# Patient Record
Sex: FEMALE | Race: BLACK OR AFRICAN AMERICAN | NOT HISPANIC OR LATINO | Employment: OTHER | URBAN - METROPOLITAN AREA
[De-identification: names, ages, dates, MRNs, and addresses within clinical notes are randomized per-mention and may not be internally consistent; named-entity substitution may affect disease eponyms.]

---

## 2017-02-27 DIAGNOSIS — G40.201 LOCALIZATION-RELATED (FOCAL) (PARTIAL) SYMPTOMATIC EPILEPSY AND EPILEPTIC SYNDROMES WITH COMPLEX PARTIAL SEIZURES, NOT INTRACTABLE, WITH STATUS EPILEPTICUS (H): ICD-10-CM

## 2017-02-28 RX ORDER — LEVETIRACETAM 500 MG/1
TABLET ORAL
Qty: 90 TABLET | Refills: 0 | Status: SHIPPED | OUTPATIENT
Start: 2017-02-28 | End: 2017-03-27

## 2017-03-27 DIAGNOSIS — G40.201 LOCALIZATION-RELATED (FOCAL) (PARTIAL) SYMPTOMATIC EPILEPSY AND EPILEPTIC SYNDROMES WITH COMPLEX PARTIAL SEIZURES, NOT INTRACTABLE, WITH STATUS EPILEPTICUS (H): ICD-10-CM

## 2017-03-28 RX ORDER — LEVETIRACETAM 500 MG/1
TABLET ORAL
Qty: 90 TABLET | Refills: 0 | Status: SHIPPED | OUTPATIENT
Start: 2017-03-28 | End: 2018-09-06

## 2017-06-15 DIAGNOSIS — G40.201 LOCALIZATION-RELATED (FOCAL) (PARTIAL) SYMPTOMATIC EPILEPSY AND EPILEPTIC SYNDROMES WITH COMPLEX PARTIAL SEIZURES, NOT INTRACTABLE, WITH STATUS EPILEPTICUS (H): ICD-10-CM

## 2017-06-15 RX ORDER — LEVETIRACETAM 500 MG/1
TABLET ORAL
Qty: 90 TABLET | Refills: 1 | Status: SHIPPED | OUTPATIENT
Start: 2017-06-15 | End: 2017-10-18

## 2017-09-13 ENCOUNTER — OFFICE VISIT (OUTPATIENT)
Dept: NEUROLOGY | Facility: CLINIC | Age: 57
End: 2017-09-13

## 2017-09-13 VITALS — HEART RATE: 113 BPM | RESPIRATION RATE: 18 BRPM | SYSTOLIC BLOOD PRESSURE: 100 MMHG | DIASTOLIC BLOOD PRESSURE: 74 MMHG

## 2017-09-13 DIAGNOSIS — G40.309 GENERALIZED CONVULSIVE EPILEPSY (H): Primary | ICD-10-CM

## 2017-09-13 ASSESSMENT — PAIN SCALES - GENERAL: PAINLEVEL: NO PAIN (0)

## 2017-09-13 NOTE — PROGRESS NOTES
"INTERVAL HISTORY:   This patient is seen in followup with a seizure disorder.  She is here with her /PCA Rohan.  I last saw the patient in 10/2016.  At that time she was having issues with seizure disorder and has chronic gait imbalance.  She is on Keppra 500 mg 2 or 3 tablets a day, depending on \"how she is doing.\"  The  reports that in May, there was an event.  The patient and Rohan were asked to leave their rental property.  This created tremendous stress for the patient.  They could not find a place to go.  They ended up in a hotel.  He had gone out to the store 1 day.  When he came back she \"looked lost.\"  She reported that she had called the bank and threatened them with bombing.  Rohan thought that she was joking.  However, shortly thereafter, the police showed up.  She was incarcerated for 4 days.  She has a court date coming up.  She really has no recollection of calling the bank and threatening them, although she does admit that she was quite stressed out by the problem with housing and does not know what she did.  She has never had an arrest or a legal problem like this before, so the entire episode is somewhat mystifying.  They now have a new residence in a second floor apartment.  That is going well.  She was having \"3-5 attacks a day.\"  Her eyes would roll back her body would \"lock up.\"  She had 1 episode where she fell.  However, Rohan says she has not had any seizures in the last 2 months.  She is on Keppra 500 mg twice a day.  A Keppra level in June was 8.      PHYSICAL EXAMINATION:   VITAL SIGNS:   Her blood pressure is 100/74.     NEUROLOGIC:   She walks on a wide base, and that is chronic.  There is nothing else to add on neurologic exam.      ASSESSMENT:     1.  Seizure disorder, on Keppra.   2.  Gait imbalance.      DISCUSSION:  The patient is seen with the above problem list.  It sounds like she had some sort of confusional event in May when she threatened the bank.  It is not really " clear to me what happened, but she did get into significant trouble with the police as a consequence.  It would be unusual for a person to have a seizure and carry out a willful act like that.  However, there could have been some postictal confusion if she had had a seizure.  In any case, that along with Rohan's history that occasionally the patient has up to 5 attacks a day would suggest that her seizures are not controlled.  Her levetiracetam level was low, at 8.  I am going to refer her to one of the epilepsy experts in this regard.  I am going to increase her Keppra dose back to 500 mg 3 times a day.  In reviewing her record, she has had Keppra levels as high as 75 in 2017.  I did not make an appointment to see her back, but will do so on an as needed basis.      Ney Valencia MD      cc:   Florencia Driscoll DO   [place] [address] [place] [address]         NEY VALENCIA MD             D: 2017 15:38   T: 2017 16:53   MT: AKA      Name:     PATO FOLEY   MRN:      [id number]        Account:      [id number]   :      1960           Service Date: 2017      Document: [id number]

## 2017-09-13 NOTE — LETTER
"9/13/2017       RE: Kim Mayen  [address]  [place] [address]     Dear Colleague,    Thank you for referring your patient, Kim Mayen, to the [facility] at [facility]. Please see a copy of my visit note below.    INTERVAL HISTORY:   This patient is seen in followup with a seizure disorder.  She is here with her /PCA Rohan.  I last saw the patient in 10/2016.  At that time she was having issues with seizure disorder and has chronic gait imbalance.  She is on Keppra 500 mg 2 or 3 tablets a day, depending on \"how she is doing.\"  The  reports that in May, there was an event.  The patient and Rohan were asked to leave their rental property.  This created tremendous stress for the patient.  They could not find a place to go.  They ended up in a hotel.  He had gone out to the store 1 day.  When he came back she \"looked lost.\"  She reported that she had called the bank and threatened them with bombing.  Rohan thought that she was choking.  However, shortly thereafter, the police showed up.  She was incarcerated for 4 days.  She has a court date coming up.  She really has no recollection of calling the bank and threatening them, although she does admit that she was quite stressed out by the problem with housing and does not know what she did.  She has never had an arrest or a legal problem like this before, so the entire episode is somewhat mystifying.  They now have a new residence in a second floor apartment.  That is going well.  She was having \"3-5 attacks a day.\"  Her eyes would roll back her body would \"lock up.\"  She had 1 episode where she fell.  However, Rohan says she has not had any seizures in the last 2 months.  She is on Keppra 500 mg twice a day.  A Keppra level in June was 8.      PHYSICAL EXAMINATION:   VITAL SIGNS:   Her blood pressure is 100/74.     NEUROLOGIC:   She walks on a wide base, and " that is chronic.  There is nothing else to add on neurologic exam.      ASSESSMENT:     1.  Seizure disorder, on Keppra.   2.  Gait imbalance.      DISCUSSION:  The patient is seen with the above problem list.  It sounds like she had some sort of confusional event in May when she threatened the bank.  It is not really clear to me what happened, but she did get into significant trouble with the police as a consequence.  It would be unusual for a person to have a seizure and carry out a willful act like that.  However, there could have been some postictal confusion if she had had a seizure.  In any case, that along with Rohan's history that occasionally the patient has up to 5 attacks a day would suggest that her seizures are not controlled.  Her levetiracetam level was low, at 8.  I am going to refer her to one of the epilepsy experts in this regard.  I am going to increase her Keppra dose back to 500 mg 3 times a day.  In reviewing her record, she has had Keppra levels as high as 75 in 2017.  I did not make an appointment to see her back, but will do so on an as needed basis.      Sergio Valencia MD      cc:   Florencia Driscoll DO   [facility]    [address] [place] [address]              D: 2017 15:38   T: 2017 16:53   MT: AKA      Name:     PATO FOLEY   MRN:      [id number]        Account:      [id number]   :      1960           Service Date: 2017      Document: [id number]

## 2017-09-13 NOTE — MR AVS SNAPSHOT
After Visit Summary   9/13/2017    Kim Mayen    MRN: [id number]           Patient Information     Date Of Birth          1960        Visit Information        Provider Department      9/13/2017 3:00 PM Sergio Valencia MD [facility]        Today's Diagnoses     Generalized convulsive epilepsy (H)    -  1       Follow-ups after your visit        Additional Services     NEUROLOGY ADULT REFERRAL       Your provider has referred you for the following:   Consult at [facility]: Neurology Clinic [facility] [phone]   [email or web address]  Epilepsy    Please be aware that coverage of these services is subject to the terms and limitations of your health insurance plan.  Call member services at your health plan with any benefit or coverage questions.      Please bring the following with you to your appointment:    (1) Any X-Rays, CTs or MRIs which have been performed.  Contact the facility where they were done to arrange for  prior to your scheduled appointment.    (2) List of current medications  (3) This referral request   (4) Any documents/labs given to you for this referral                  Follow-up notes from your care team     Return if symptoms worsen or fail to improve.      Who to contact     Please call your clinic at [phone] to:    Ask questions about your health    Make or cancel appointments    Discuss your medicines    Learn about your test results    Speak to your doctor   If you have compliments or concerns about an experience at your clinic, or if you wish to file a complaint, please contact [facility] Patient Relations at [phone] or email us at [email or web address]         Additional Information About Your Visit        MyChart Information     Olocity is an electronic gateway that provides easy, online access to your medical records. With  Cleankeyshart, you can request a clinic appointment, read your test results, renew a prescription or communicate with your care team.     To sign up for komoot visit the website at [email or web address]   You will be asked to enter the access code listed below, as well as some personal information. Please follow the directions to create your username and password.     Your access code is: [email or web address]  Expires: 2017  7:42 AM     Your access code will  in 90 days. If you need help or a new code, please contact your [facility] or call [phone] for assistance.        Care EveryWhere ID     This is your Care EveryWhere ID. This could be used by other organizations to access your [place] medical records  [id number]        Your Vitals Were     Pulse Respirations                113 18           Blood Pressure from Last 3 Encounters:   17 100/74   10/19/16 110/68   07/22/15 90/54    Weight from Last 3 Encounters:   No data found for Wt               Primary Care Provider Office Phone # Fax #    Florencia DO Americo [phone]       [place] [id number] [place] [address]        Equal Access to Services     TAWANDA LEAL AH: Hadii pao chapao Soantonioali, waaxda luqadaha, qaybta kaalmada adeegyada, lucian pineda. So [facility] [phone].    ATENCIÓN: Si habla español, tiene a skinner disposición servicios gratuitos de asistencia lingüística. Llame al [phone].    We comply with applicable federal civil rights laws and [place] laws. We do not discriminate on the basis of race, color, national origin, age, disability sex, sexual orientation or gender identity.            Thank you!     Thank you for choosing [facility]UR[facility]  for your care. Our goal is always to provide you with excellent care. Hearing back from our patients is one way we can continue to improve our services.  Please take a few minutes to complete the written survey that you may receive in the mail after your visit with us. Thank you!             Your Updated Medication List - Protect others around you: Learn how to safely use, store and throw away your medicines at www.disposemymeds.org.          This list is accurate as of: 9/13/17 11:59 PM.  Always use your most recent med list.                   Brand Name Dispense Instructions for use Diagnosis    ALBUTEROL IN      Inhale into the lungs as needed        * levETIRAcetam 500 MG tablet    KEPPRA    90 tablet    TAKE 1 TABLET(500 MG) BY MOUTH THREE TIMES DAILY    Localization-related (focal) (partial) symptomatic epilepsy and epileptic syndromes with complex partial seizures, not intractable, with status epilepticus (H)       * levETIRAcetam 500 MG tablet    KEPPRA    90 tablet    TAKE 1 TABLET(500 MG) BY MOUTH THREE TIMES DAILY    Localization-related (focal) (partial) symptomatic epilepsy and epileptic syndromes with complex partial seizures, not intractable, with status epilepticus (H)       LORATADINE PO      Take by mouth daily        MULTIVITAMINS PO      Take by mouth daily        VITAMIN D3 PO      Take by mouth daily        * Notice:  This list has 2 medication(s) that are the same as other medications prescribed for you. Read the directions carefully, and ask your doctor or other care provider to review them with you.

## 2017-10-18 DIAGNOSIS — G40.201 LOCALIZATION-RELATED SYMPTOMATIC EPILEPSY AND EPILEPTIC SYNDROMES WITH COMPLEX PARTIAL SEIZURES, NOT INTRACTABLE, WITH STATUS EPILEPTICUS (H): ICD-10-CM

## 2017-10-18 RX ORDER — LEVETIRACETAM 500 MG/1
TABLET ORAL
Qty: 90 TABLET | Refills: 0 | Status: SHIPPED | OUTPATIENT
Start: 2017-10-18 | End: 2017-11-28

## 2017-11-28 DIAGNOSIS — G40.201 LOCALIZATION-RELATED SYMPTOMATIC EPILEPSY AND EPILEPTIC SYNDROMES WITH COMPLEX PARTIAL SEIZURES, NOT INTRACTABLE, WITH STATUS EPILEPTICUS (H): ICD-10-CM

## 2017-11-30 RX ORDER — LEVETIRACETAM 500 MG/1
TABLET ORAL
Qty: 90 TABLET | Refills: 3 | Status: SHIPPED | OUTPATIENT
Start: 2017-11-30 | End: 2018-05-16

## 2018-05-16 DIAGNOSIS — G40.201 LOCALIZATION-RELATED SYMPTOMATIC EPILEPSY AND EPILEPTIC SYNDROMES WITH COMPLEX PARTIAL SEIZURES, NOT INTRACTABLE, WITH STATUS EPILEPTICUS (H): ICD-10-CM

## 2018-05-16 RX ORDER — LEVETIRACETAM 500 MG/1
TABLET ORAL
Qty: 90 TABLET | Refills: 0 | Status: SHIPPED | OUTPATIENT
Start: 2018-05-16 | End: 2018-07-09

## 2018-07-09 DIAGNOSIS — G40.201 LOCALIZATION-RELATED SYMPTOMATIC EPILEPSY AND EPILEPTIC SYNDROMES WITH COMPLEX PARTIAL SEIZURES, NOT INTRACTABLE, WITH STATUS EPILEPTICUS (H): ICD-10-CM

## 2018-07-11 RX ORDER — LEVETIRACETAM 500 MG/1
TABLET ORAL
Qty: 90 TABLET | Refills: 0 | Status: SHIPPED | OUTPATIENT
Start: 2018-07-11 | End: 2018-08-08

## 2018-08-08 DIAGNOSIS — G40.201 LOCALIZATION-RELATED SYMPTOMATIC EPILEPSY AND EPILEPTIC SYNDROMES WITH COMPLEX PARTIAL SEIZURES, NOT INTRACTABLE, WITH STATUS EPILEPTICUS (H): ICD-10-CM

## 2018-08-08 RX ORDER — LEVETIRACETAM 500 MG/1
TABLET ORAL
Qty: 90 TABLET | Refills: 0 | Status: SHIPPED | OUTPATIENT
Start: 2018-08-08 | End: 2020-03-16

## 2018-08-08 NOTE — TELEPHONE ENCOUNTER
Pt failed refill protocol due to:       Anti-Seizure Meds Protocol Failed8/8 9:28 AM  x Review Authorizing provider's last note.    x Normal CBC on file in past 26 months   x Normal ALT or AST on file in past 26 months   x Normal platelet count on file in past 26 months     Pt does have follow up scheduled with [place]. Valencia [facility]. Will route to Dr. Valencia for review. Kely Fernandes RN

## 2018-09-05 ENCOUNTER — PATIENT OUTREACH (OUTPATIENT)
Dept: CARE COORDINATION | Facility: CLINIC | Age: 58
End: 2018-09-05

## 2018-09-06 ENCOUNTER — OFFICE VISIT (OUTPATIENT)
Dept: NEUROLOGY | Facility: CLINIC | Age: 58
End: 2018-09-06
Payer: COMMERCIAL

## 2018-09-06 VITALS
BODY MASS INDEX: 17.63 KG/M2 | DIASTOLIC BLOOD PRESSURE: 75 MMHG | WEIGHT: 109.7 LBS | HEIGHT: 66 IN | SYSTOLIC BLOOD PRESSURE: 137 MMHG | HEART RATE: 76 BPM

## 2018-09-06 DIAGNOSIS — G40.909 SEIZURE DISORDER (H): Primary | ICD-10-CM

## 2018-09-06 PROBLEM — K86.1 CHRONIC PANCREATITIS (H): Status: ACTIVE | Noted: 2018-09-06

## 2018-09-06 PROBLEM — R41.89 IMPAIRED COGNITION: Status: ACTIVE | Noted: 2017-05-22

## 2018-09-06 PROBLEM — D50.9 MICROCYTIC ANEMIA: Status: ACTIVE | Noted: 2018-09-06

## 2018-09-06 PROBLEM — R63.0 APPETITE IMPAIRED: Status: ACTIVE | Noted: 2018-08-09

## 2018-09-06 PROBLEM — Z86.73 HISTORY OF ARTERIAL ISCHEMIC STROKE: Status: ACTIVE | Noted: 2017-05-22

## 2018-09-06 RX ORDER — LACTOSE-REDUCED FOOD
LIQUID (ML) ORAL DAILY
COMMUNITY
Start: 2018-05-01

## 2018-09-06 RX ORDER — LEVETIRACETAM 500 MG/1
1000 TABLET ORAL 2 TIMES DAILY
Qty: 120 TABLET | Refills: 3 | Status: SHIPPED | OUTPATIENT
Start: 2018-09-06 | End: 2019-04-02

## 2018-09-06 RX ORDER — LANOLIN ALCOHOL/MO/W.PET/CERES
100 CREAM (GRAM) TOPICAL DAILY
COMMUNITY
Start: 2016-05-06

## 2018-09-06 RX ORDER — LEVETIRACETAM 500 MG/1
500 TABLET ORAL 3 TIMES DAILY
Qty: 90 TABLET | Refills: 0 | Status: SHIPPED | OUTPATIENT
Start: 2018-09-06 | End: 2018-09-06

## 2018-09-06 ASSESSMENT — PAIN SCALES - GENERAL: PAINLEVEL: NO PAIN (0)

## 2018-09-06 NOTE — PROGRESS NOTES
Service Date: 2018      INTERVAL HISTORY:  This patient is seen in followup with seizure disorder.  I last saw her a year ago.  She is here today with her /PCA, Rohan.  The patient has seizure disorder.  She is on Keppra 500 mg 3 times a day.  I put her on that dose last year.  I referred her to Epilepsy for another opinion, but that appointment never happened.  She continues to have seizures.  Rohan says the most recent seizure occurred on Tuesday.  She went into a seizure, her eyes rolled back and her body locked up.  It lasted 3-4 minutes and she was confused afterwards.  This was a typical event.  She is having them apparently about once a month, and that is an improvement compared the report last year when she was having up to 3 attacks a day or more.      PHYSICAL EXAMINATION:   GENERAL:  The patient is cooperative and in no distress.   VITAL SIGNS:  Her blood pressure is 137/75.   NEUROLOGIC:  There is nothing to add on neurologic exam.      ASSESSMENT:   1.  Seizure disorder, on Keppra.   2.  Gait imbalance.      DISCUSSION:  The patient is seen in follow-up with the above issues.  Neurologically, she seems to be about the same.  By Rohan's report, she was hospitalized presumably at [facility] a few years ago for Epilepsy monitoring.  At this point, I am going to again have her referred to one of the epileptologists to see if there is any further workup that should be undertaken or if any modifications and treatment should be made.  I am increasing her dose of Keppra to 1000 mg twice a day.  I did not make an appointment to see her in followup, but will do so after Epilepsy consultation and recommendation.      MD NEY Oglesby MD             D: 2018   T: 2018   MT: AKA      Name:     PATO FOLEY   MRN:      [id number]        Account:      [id number]   :      1960           Service Date: 2018      Document: [id number]

## 2018-09-06 NOTE — MR AVS SNAPSHOT
After Visit Summary   9/6/2018    Kim Mayen    MRN: [id number]           Patient Information     Date Of Birth          1960        Visit Information        Provider Department      9/6/2018 2:30 PM Sergio Valencia MD [facility]        Today's Diagnoses     Seizure disorder (H)    -  1       Follow-ups after your visit        Additional Services     NEUROLOGY ADULT REFERRAL       Your provider has referred you for the following:   Consult at [facility]: [facility] Epilepsy [facility] [phone]   [email or web address]    Please be aware that coverage of these services is subject to the terms and limitations of your health insurance plan.  Call member services at your health plan with any benefit or coverage questions.      Please bring the following with you to your appointment:    (1) Any X-Rays, CTs or MRIs which have been performed.  Contact the facility where they were done to arrange for  prior to your scheduled appointment.    (2) List of current medications  (3) This referral request   (4) Any documents/labs given to you for this referral                  Follow-up notes from your care team     Return if symptoms worsen or fail to improve.      Your next 10 appointments already scheduled     Oct 02, 2018  2:00 PM CDT   (Arrive by 1:45 PM)   New Seizure with Khai Peguero MD   [facility] ([facility])    [address] [place] [id number] [phone]              Who to contact     Please call your clinic at [phone] to:    Ask questions about your health    Make or cancel appointments    Discuss your medicines    Learn about your test results    Speak to your doctor            Additional Information About Your Visit        Odeeo Information     Odeeo is an electronic gateway that provides easy, online access to your medical records. With Odeeo, you can request a clinic  "appointment, read your test results, renew a prescription or communicate with your care team.     To sign up for Medicastt visit the website at [email or web address]   You will be asked to enter the access code listed below, as well as some personal information. Please follow the directions to create your username and password.     Your access code is: [email or web address]  Expires: 2018  6:30 AM     Your access code will  in 90 days. If you need help or a new code, please contact your [facility] or call [phone] for assistance.        Care EveryWhere ID     This is your Care EveryWhere ID. This could be used by other organizations to access your [place] medical records  [id number]        Your Vitals Were     Pulse Height Breastfeeding? BMI (Body Mass Index)          76 1.664 m (5' 5.5\") No 17.98 kg/m2         Blood Pressure from Last 3 Encounters:   18 137/75   17 100/74   10/19/16 110/68    Weight from Last 3 Encounters:   18 49.8 kg (109 lb 11.2 oz)              We Performed the Following     NEUROLOGY ADULT REFERRAL          Today's Medication Changes          These changes are accurate as of 18  2:54 PM.  If you have any questions, ask your nurse or doctor.               These medicines have changed or have updated prescriptions.        Dose/Directions    * levETIRAcetam 500 MG tablet   Commonly known as:  KEPPRA   This may have changed:  Another medication with the same name was added. Make sure you understand how and when to take each.   Used for:  Localization-related symptomatic epilepsy and epileptic syndromes with complex partial seizures, not intractable, with status epilepticus (H)   Changed by:  Sergio Valencia MD        TAKE 1 TABLET BY MOUTH THREE TIMES DAILY   Quantity:  90 tablet   Refills:  0       * levETIRAcetam 500 MG tablet   Commonly known as:  KEPPRA   This may have changed:  You were already taking a medication with " the same name, and this prescription was added. Make sure you understand how and when to take each.   Used for:  Seizure disorder (H)   Changed by:  Sergio Valencia MD        Dose:  1000 mg   Take 2 tablets (1,000 mg) by mouth 2 times daily   Quantity:  120 tablet   Refills:  3       * Notice:  This list has 2 medication(s) that are the same as other medications prescribed for you. Read the directions carefully, and ask your doctor or other care provider to review them with you.         Where to get your medicines      These medications were sent to [id number] - [place], [place] - [address] AT [facility] [address], [place] [address]     Phone:  [phone]     levETIRAcetam 500 MG tablet                Primary Care Provider Office Phone # Fax #    Florencia Driscoll  [phone]       [facility] [address] [facility] [address]        Equal Access to Services     TAWANDA LEAL AH: Hadii pao ku hadasho Soomaali, waaxda luqadaha, qaybta kaalmada adeegyada, waxay idiin hayloun wu christopher . So [facility] [phone].    ATENCIÓN: Si palmira mack, tiene a skinner disposición servicios gratuitos de asistencia lingüística. Llame al [phone].    We comply with applicable federal civil rights laws and [place] laws. We do not discriminate on the basis of race, color, national origin, age, disability, sex, sexual orientation, or gender identity.            Thank you!     Thank you for choosing [facility] NEUROLOGY  for your care. Our goal is always to provide you with excellent care. Hearing back from our patients is one way we can continue to improve our services. Please take a few minutes to complete the written survey that you may receive in the mail after your visit with us. Thank you!             Your Updated Medication List - Protect others around you: Learn how to safely use, store and throw away your medicines at www.disposemymeds.org.           This list is accurate as of 9/6/18  2:54 PM.  Always use your most recent med list.                   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 Read the directions carefully, and ask your doctor or other care provider to review them with you.

## 2018-09-06 NOTE — LETTER
9/6/2018       RE: Kim Mayen  [address]  [place] [address]     Dear Colleague,    Thank you for referring your patient, Kim Mayen, to the [facility] NEUROLOGY at [facility]. Please see a copy of my visit note below.    Service Date: 09/06/2018      INTERVAL HISTORY:  This patient is seen in followup with seizure disorder.  I last saw her a year ago.  She is here today with her /PCA, Rohan.  The patient has seizure disorder.  She is on Keppra 500 mg 3 times a day.  I put her on that dose last year.  I referred her to Epilepsy for another opinion, but that appointment never happened.  She continues to have seizures.  Rohan says the most recent seizure occurred on Tuesday.  She went into a seizure, her eyes rolled back and her body locked up.  It lasted 3-4 minutes and she was confused afterwards.  This was a typical event.  She is having them apparently about once a month, and that is an improvement compared the report last year when she was having up to 3 attacks a day or more.      PHYSICAL EXAMINATION:   GENERAL:  The patient is cooperative and in no distress.   VITAL SIGNS:  Her blood pressure is 137/75.   NEUROLOGIC:  There is nothing to add on neurologic exam.      ASSESSMENT:   1.  Seizure disorder, on Keppra.   2.  Gait imbalance.      DISCUSSION:  The patient is seen in follow-up with the above issues.  Neurologically, she seems to be about the same.  By Rohan's report, she was hospitalized presumably at [facility] a few years ago for Epilepsy monitoring.  At this point, I am going to again have her referred to one of the epileptologists to see if there is any further workup that should be undertaken or if any modifications and treatment should be made.  I am increasing her dose of Keppra to 1000 mg twice a day.  I did not make an appointment to see her in followup, but will do so after Epilepsy consultation and recommendation.      D:  2018   T: 2018   MT: AKA      Name:     PATO FOLEY   MRN:      [id number]        Account:      [id number]   :      1960           Service Date: 2018      Document: [id number]       Again, thank you for allowing me to participate in the care of your patient.      Sincerely,    Sergio Valencia MD

## 2018-09-06 NOTE — NURSING NOTE
Chief Complaint   Patient presents with     RECHECK     [employer] RETURN ANNUAL F/U       Ngozi Garcia, EMT

## 2018-09-07 ASSESSMENT — PATIENT HEALTH QUESTIONNAIRE - PHQ9: SUM OF ALL RESPONSES TO PHQ QUESTIONS 1-9: 0

## 2019-04-02 DIAGNOSIS — G40.909 SEIZURE DISORDER (H): ICD-10-CM

## 2019-04-02 RX ORDER — LEVETIRACETAM 500 MG/1
1000 TABLET ORAL 2 TIMES DAILY
Qty: 120 TABLET | Refills: 3 | Status: SHIPPED | OUTPATIENT
Start: 2019-04-02 | End: 2019-11-13

## 2019-11-13 ENCOUNTER — TELEPHONE (OUTPATIENT)
Dept: NEUROLOGY | Facility: CLINIC | Age: 59
End: 2019-11-13

## 2019-11-13 DIAGNOSIS — G40.909 SEIZURE DISORDER (H): ICD-10-CM

## 2019-11-13 RX ORDER — LEVETIRACETAM 500 MG/1
1000 TABLET ORAL 2 TIMES DAILY
Qty: 120 TABLET | Refills: 3 | Status: SHIPPED | OUTPATIENT
Start: 2019-11-13 | End: 2020-01-04

## 2019-11-13 NOTE — TELEPHONE ENCOUNTER
Rx Authorization:    Requested Medication/ Dose levETIRAcetam (KEPPRA) 500 MG tablet    Date last refill ordered: 04/02/19    Quantity ordered: 120    # refills: 3    Date of last clinic visit with ordering provider: 09/06/18    Date of next clinic visit with ordering provider: None Scheduled     All pertinent protocol data (lab date/result):     Include pertinent information from patients message:

## 2020-01-03 DIAGNOSIS — G40.909 SEIZURE DISORDER (H): ICD-10-CM

## 2020-01-03 NOTE — TELEPHONE ENCOUNTER
[facility] Call [facility]    Phone Message    May a detailed message be left on voicemail: yes    Reason for Call: Medication Refill Request    Has the patient contacted the pharmacy for the refill? Yes   Name of medication being requested: levETIRAcetam (KEPPRA) 500 MG tablet  Provider who prescribed the medication: Dr. Valencia  Pharmacy: [facility] [phone]  Date medication is needed: 1/2/20   Pt needing a call back to discuss this medication, pt is almost out      Action Taken: Message routed to:  [facility] neuro

## 2020-01-03 NOTE — TELEPHONE ENCOUNTER
Rx Authorization:    Requested Medication/ Dose levETIRAcetam (KEPPRA) 500 MG tablet    Date last refill ordered: 04/02/19    Quantity ordered: 120    # refills: 3    Date of last clinic visit with ordering provider: 09/06/18    Date of next clinic visit with ordering provider: None Scheduled     All pertinent protocol data (lab date/result):     Include pertinent

## 2020-01-04 RX ORDER — LEVETIRACETAM 500 MG/1
1000 TABLET ORAL 2 TIMES DAILY
Qty: 120 TABLET | Refills: 3 | Status: SHIPPED | OUTPATIENT
Start: 2020-01-04 | End: 2020-01-08

## 2020-01-08 DIAGNOSIS — G40.909 SEIZURE DISORDER (H): ICD-10-CM

## 2020-01-08 RX ORDER — LEVETIRACETAM 500 MG/1
1000 TABLET ORAL 2 TIMES DAILY
Qty: 120 TABLET | Refills: 3 | Status: SHIPPED | OUTPATIENT
Start: 2020-01-08 | End: 2020-06-15

## 2020-01-08 NOTE — TELEPHONE ENCOUNTER
[facility] Call Center    Phone Message    May a detailed message be left on voicemail: yes    Reason for Call: Medication Refill Request    Has the patient contacted the pharmacy for the refill? Yes   Name of medication being requested: levETIRAcetam (KEPPRA) 500 MG tablet  Provider who prescribed the medication: Dr. Sergio Valencia  Pharmacy: [facility] #[id number] - [place], [place] - [address] AT [address]  Date medication is needed: asap, please send to this pharmacy instead         Action Taken: Message routed to:  [facility] neuro

## 2020-01-08 NOTE — TELEPHONE ENCOUNTER
Rx Authorization:    Requested Medication/ Dose:500 mg    Date last refill ordered: 1/4/120    Quantity ordered: 120 tabs    # refills: 3    Date of last clinic visit with ordering provider: 9/6/18    Date of next clinic visit with ordering provider: f/u 1 year    All pertinent protocol data (lab date/result):     Include pertinent information from patients message:

## 2020-03-16 DIAGNOSIS — G40.201 LOCALIZATION-RELATED SYMPTOMATIC EPILEPSY AND EPILEPTIC SYNDROMES WITH COMPLEX PARTIAL SEIZURES, NOT INTRACTABLE, WITH STATUS EPILEPTICUS (H): ICD-10-CM

## 2020-03-16 RX ORDER — LEVETIRACETAM 500 MG/1
TABLET ORAL
Qty: 90 TABLET | Refills: 0 | Status: SHIPPED | OUTPATIENT
Start: 2020-03-16 | End: 2020-04-29

## 2020-03-16 NOTE — TELEPHONE ENCOUNTER
[facility] Call [facility]    Phone Message    May a detailed message be left on voicemail: yes     Reason for Call: Medication Refill Request    Has the patient contacted the pharmacy for the refill? Yes   Name of medication being requested: levETIRAcetam (KEPPRA) 500 MG tablet  Provider who prescribed the medication: Dr. Valencia   Pharmacy: [facility] #[id number] - [place], [place] - [address]     Date medication is needed: ASAP - Pt is out         Action Taken: Message routed to:  [facility] Neurology    Travel Screening: Not Applicable

## 2020-03-16 NOTE — TELEPHONE ENCOUNTER
Rx Authorization:    Requested Medication/ Dose:Keppra 500mg    Date last refill ordered: 1/8/20    Quantity ordered: 120tabas    # refills: 3    Date of last clinic visit with ordering provider: 9/6/18    Date of next clinic visit with ordering provider: f/u 1 year    All pertinent protocol data (lab date/result):     Include pertinent information from patients message:

## 2020-04-29 ENCOUNTER — TELEPHONE (OUTPATIENT)
Dept: NEUROLOGY | Facility: CLINIC | Age: 60
End: 2020-04-29

## 2020-04-29 DIAGNOSIS — G40.201 LOCALIZATION-RELATED SYMPTOMATIC EPILEPSY AND EPILEPTIC SYNDROMES WITH COMPLEX PARTIAL SEIZURES, NOT INTRACTABLE, WITH STATUS EPILEPTICUS (H): ICD-10-CM

## 2020-04-29 RX ORDER — LEVETIRACETAM 500 MG/1
TABLET ORAL
Qty: 90 TABLET | Refills: 0 | Status: CANCELLED | OUTPATIENT
Start: 2020-04-29

## 2020-04-29 RX ORDER — LEVETIRACETAM 500 MG/1
TABLET ORAL
Qty: 90 TABLET | Refills: 0 | Status: SHIPPED | OUTPATIENT
Start: 2020-04-29 | End: 2020-06-15

## 2020-04-29 NOTE — TELEPHONE ENCOUNTER
Attempted to call pt on all three numbers available to clarify keppra prescription. Would like to verify is she is taking 500mg 2 tabs twice a day or 500mg 1 tab three times a day.     No answer home or friend's number. Mobile number answered but Kim is no longer available at this number.     Will notify provider and see what next step should be.     Salima JANSEN

## 2020-04-29 NOTE — TELEPHONE ENCOUNTER
[facility]    Phone Message    May a detailed message be left on voicemail: yes     Reason for Call: Medication Refill Request    Has the patient contacted the pharmacy for the refill? Yes   Name of medication being requested: levETIRAcetam (KEPPRA) 500 MG tablet   Provider who prescribed the medication: NEDRA Valencia  Pharmacy: [facility] #[id number] - [place], [place] - [address]   Date medication is needed: now-pt is running out per mary anne. Thanks          Action Taken: Message routed to:  [facility]  neuro    Travel Screening: Not Applicable

## 2020-04-29 NOTE — TELEPHONE ENCOUNTER
Salima. This refill looks wrong. When last seen and refilled by Dr Valencia I believe it was for Keppra 500 mg-2TABLETS TWICE A DAY>. Please verify with patient. ADRIENNE Delarosa

## 2020-04-29 NOTE — TELEPHONE ENCOUNTER
Rx Authorization:    Requested Medication/ Dose: Keprra 500mg    Date last refill ordered: 3/16/20    Quantity ordered: 90    # refills: 0    Date of last clinic visit with ordering provider: 9/6/2018    Date of next clinic visit with ordering provider: none    All pertinent protocol data (lab date/result):     Include pertinent information from patients message:

## 2020-06-15 DIAGNOSIS — G40.909 SEIZURE DISORDER (H): ICD-10-CM

## 2020-06-15 DIAGNOSIS — G40.201 LOCALIZATION-RELATED SYMPTOMATIC EPILEPSY AND EPILEPTIC SYNDROMES WITH COMPLEX PARTIAL SEIZURES, NOT INTRACTABLE, WITH STATUS EPILEPTICUS (H): ICD-10-CM

## 2020-06-15 RX ORDER — LEVETIRACETAM 500 MG/1
TABLET ORAL
Qty: 90 TABLET | Refills: 0 | Status: SHIPPED | OUTPATIENT
Start: 2020-06-15 | End: 2021-10-06

## 2020-06-15 RX ORDER — LEVETIRACETAM 500 MG/1
1000 TABLET ORAL 2 TIMES DAILY
Qty: 120 TABLET | Refills: 3 | Status: SHIPPED | OUTPATIENT
Start: 2020-06-15 | End: 2020-08-17

## 2020-06-15 NOTE — TELEPHONE ENCOUNTER
Rx Authorization:    Requested Medication/ Dose:Keppra 500Mg    Date last refill ordered: 4/29/20    Quantity ordered: 90 tabs    # refills:     Date of last clinic visit with ordering provider: 9/6/18    Date of next clinic visit with ordering provider: f/u 1 year    All pertinent protocol data (lab date/result):     Include pertinent information from patients message:

## 2020-06-15 NOTE — TELEPHONE ENCOUNTER
[facility] Call Center    Phone Message    May a detailed message be left on voicemail: no     Reason for Call: Medication Refill Request    Has the patient contacted the pharmacy for the refill? Yes   Name of medication being requested: levETIRAcetam (KEPPRA) 500 MG tablet   Provider who prescribed the medication: Dr. Delarosa last prescribing provider  Pharmacy: [facility], [place]  Date medication is needed: 06/19/20    Action Taken: Message routed to:  [facility]    Travel Screening: Not Applicable

## 2020-06-18 ENCOUNTER — TELEPHONE (OUTPATIENT)
Dept: NEUROLOGY | Facility: CLINIC | Age: 60
End: 2020-06-18

## 2020-06-18 NOTE — TELEPHONE ENCOUNTER
I called pt S.LAISHA Madrigal back. I let him know we have not seen Kim since September 2018 which is why we want to set up a visit. He said pt is seeing her PCP Dr. Mariah Huggins at [facility] next week. They are going to discuss having her PCP take over the keppra prescription going forward.     I did let Rohan know we have given her a few refills for now. Depending on her PCP recommendation they will call next week. I let Rohan know that we do have openings for phone and video appointment since pt is not willing to come into office for a visit. I let him know he can ask to speak with me and I can assist in scheduling a visit if needed.     Salima JANSEN

## 2020-08-10 DIAGNOSIS — G40.909 SEIZURE DISORDER (H): ICD-10-CM

## 2020-08-14 NOTE — TELEPHONE ENCOUNTER
[facility] Call Center    Phone Message    May a detailed message be left on voicemail: yes     Reason for Call: Medication Refill Request    Has the patient contacted the pharmacy for the refill? Yes   Name of medication being requested: levETIRAcetam (KEPPRA) 500 MG tablet [[id number]] (Order [id number])     Provider who prescribed the medication: Dr. Delarosa  Pharmacy: [facility] #[id number] - [place], [place] - [address]   Date medication is needed: asap         Action Taken: Message routed to:  [facility] NEUROLOGY     Travel Screening: Not Applicable

## 2020-08-14 NOTE — TELEPHONE ENCOUNTER
Rx Authorization:    Requested Medication/ Dose: Kepper 500MG    Date last refill ordered: 6/15/20    Quantity ordered: 120 tabs    # refills: 3    Date of last clinic visit with ordering provider: 9/6/18    Date of next clinic visit with ordering provider: f/u 1 year    All pertinent protocol data (lab date/result):     Include pertinent information from patients message:

## 2020-08-17 RX ORDER — LEVETIRACETAM 500 MG/1
1000 TABLET ORAL 2 TIMES DAILY
Qty: 120 TABLET | Refills: 3 | Status: SHIPPED | OUTPATIENT
Start: 2020-08-17 | End: 2021-10-06

## 2021-09-09 NOTE — TELEPHONE ENCOUNTER
RECORDS RECEIVED FROM: Self (Annie pt)   REASON FOR VISIT: seizures   Date of Appt: 10/6/21   NOTES (FOR ALL VISITS) STATUS DETAILS   OFFICE NOTE from referring provider N/A    OFFICE NOTE from other specialist Internal Dr Valencia @ [facility] Neuro:  9/6/18  9/3/17  10/19/16   DISCHARGE SUMMARY from hospital N/A    DISCHARGE REPORT from the ER N/A    OPERATIVE REPORT N/A    MEDICATION LIST Internal    IMAGING  (FOR ALL VISITS)     EMG N/A    EEG N/A    LUMBAR PUNCTURE N/A    FRANCESCA SCAN N/A    ULTRASOUND (CAROTID BILAT) *VASCULAR* N/A    MRI (HEAD, NECK, SPINE) Received [facility]:  MRI Brain 10/28/16   CT (HEAD, NECK, SPINE) N/A       Action 9/9/21 MV 12.20pm   Action Taken Imaging request faxed to [place] for:  MRI Brain 10/28/16    --9/17/21 MV 2.56pm--  Images resolved in PACS

## 2021-10-06 ENCOUNTER — OFFICE VISIT (OUTPATIENT)
Dept: NEUROLOGY | Facility: CLINIC | Age: 61
End: 2021-10-06
Payer: COMMERCIAL

## 2021-10-06 ENCOUNTER — PRE VISIT (OUTPATIENT)
Dept: NEUROLOGY | Facility: CLINIC | Age: 61
End: 2021-10-06

## 2021-10-06 VITALS
DIASTOLIC BLOOD PRESSURE: 74 MMHG | SYSTOLIC BLOOD PRESSURE: 112 MMHG | HEART RATE: 105 BPM | OXYGEN SATURATION: 96 % | RESPIRATION RATE: 16 BRPM

## 2021-10-06 DIAGNOSIS — G40.909 RECURRENT SEIZURES (H): Primary | ICD-10-CM

## 2021-10-06 PROBLEM — I51.89 SYSTOLIC DYSFUNCTION WITHOUT HEART FAILURE: Status: ACTIVE | Noted: 2019-10-17

## 2021-10-06 PROCEDURE — 80177 DRUG SCRN QUAN LEVETIRACETAM: CPT | Performed by: PATHOLOGY

## 2021-10-06 PROCEDURE — 36415 COLL VENOUS BLD VENIPUNCTURE: CPT | Performed by: PATHOLOGY

## 2021-10-06 PROCEDURE — 99205 OFFICE O/P NEW HI 60 MIN: CPT | Performed by: PSYCHIATRY & NEUROLOGY

## 2021-10-06 RX ORDER — LEVETIRACETAM 750 MG/1
750 TABLET ORAL
Qty: 90 TABLET | Refills: 11 | Status: SHIPPED | OUTPATIENT
Start: 2021-10-06 | End: 2022-11-01

## 2021-10-06 RX ORDER — LEVETIRACETAM 750 MG/1
TABLET ORAL
COMMUNITY
Start: 2021-09-16 | End: 2021-10-06

## 2021-10-06 ASSESSMENT — PAIN SCALES - GENERAL: PAINLEVEL: NO PAIN (0)

## 2021-10-06 NOTE — PROGRESS NOTES
Service Date: 10/06/2021    CHIEF COMPLAINT:  Seizures.    HISTORY OF PRESENT ILLNESS:  This patient is a 61-year-old right-handed female with a history of intractable seizures.  She was previously seen by Dr. Sergio Valencia in 2018.  She was referred to the Epilepsy Clinic at that time.  However, the patient never followed through.  The patient is accompanied by her /PCA in the clinic today.  The patient and her  are both poor historians.    The patient started to have seizures about 10 years ago.  The exact cause of the seizures is unclear.  According to the , she started to have seizures after she was wrongly evicted from her apartment about 10 years ago.  Her seizures were poorly controlled over the years and she has been taking Keppra 500 mg 3 times a day for many years and she continued to have about 1 seizure per month on average.  About 2 weeks ago, she was seen by Dr. Alba Ramesh, who is her primary care doctor, and her Keppra was increased to 750 mg 3 times a day.  According to the patient and her , it seems that she is doing much better on this increased dose of Keppra.  She is compliant with the medications.  No side effects were reported.    The patient seems to only have 1 type of seizure.  She has no auras.  She will have whole body jerking motions associated with tongue biting and urinary incontinence.  Seizures usually last for about 2 minutes and she will be unresponsive.  Postictally she will be confused and extremely fatigued.  Sometimes she will sleep for a long time postictally.  She is having on average 1 seizure per month for many years.    TRIGGERS FOR SEIZURES:  Probably stress.    RISK FACTORS FOR SEIZURES:  She had no history of head trauma with loss of consciousness.  No history of CNS infection.  No history of febrile convulsions.  No history of brain tumor.  No history of stroke.  No family history of seizures.    CURRENT MEDICATIONS:  Keppra 750 mg t.i.d.       Current Outpatient Medications   Medication Sig Dispense Refill     ALBUTEROL IN Inhale into the lungs as needed       Cholecalciferol (VITAMIN D3 PO) Take by mouth daily       levETIRAcetam (KEPPRA) 750 MG tablet Take 1 tablet (750 mg) by mouth 3 times daily 90 tablet 11     LORATADINE PO Take by mouth daily       Multiple Vitamin (MULTIVITAMINS PO) Take by mouth daily       Nutritional Supplements (ENSURE) LIQD daily       rivaroxaban ANTICOAGULANT (XARELTO) 20 MG TABS tablet Take 20 mg by mouth       thiamine 100 MG tablet Take 100 mg by mouth daily         PAST ANTI-SEIZURE MEDICATIONS:  None.    PAST MEDICAL HISTORY:  Epilepsy, lower extremity DVT, history of abdominal mass, microcytic anemia.    MEDICATIONS:  None.    ALLERGIES:  No known drug allergies.    FAMILY HISTORY:  No family history of seizures.    SOCIAL HISTORY:  She lives with her /PCA.  No children.  No smoking.  Drinks alcohol occasionally.  No drug abuse.    PHYSICAL EXAMINATION:  Blood pressure 112/74, pulse 105, resp. rate 16, SpO2 96 %, not currently breastfeeding.    General exam: General Appearance:  No acute distress.  HEENT:  Normocephalic, atraumatic.  Neck:  Supple, no lymphadenopathy. Extremities:  No edema, no clubbing, no cyanosis.      Neurologic Exam:  Alert and oriented x3.  Speech fluent, appropriate. Normal attention.  Cranial Nerves:  Pupils are equal, round, reactive to light and accomodation.  Extraocular movement intact.  No facial weakness or asymmetry.  Facial sensation was normal.  Tongue and palate midline.  Hearing normal.  Visual field : normal to confrontation.   Motor Exam:  Normal bulk.  Normal tone.  Strength 5/5 in all extremities.  Sensory:  Normal to light touch and vibration in all extremities.  Deep tendon reflexes 2+ bilaterally in both upper and lower extremities.  Coordination:  Finger-to-nose, heel-to-shin exams showed no ataxia.  Rapid alternating movement was normal.  Gait and Station:   Abnormal gait, wide based, stiff. Not able to tip-toe or heel walking.      REVIEW OF SYSTEMS:  A 12-point review of systems is essentially negative.    PREVIOUS DIAGNOSTIC TESTING:  MRI scan of the brain in 2016 showed mild to moderate age-related changes with small chronic lacunar infarcts in the bilateral cerebellar hemispheres.  EEG in  at [facility] showed abnormal with generalized slowing of the background activities.  There were bifrontal as well as independent right and left interictal epileptiform discharges.    IMPRESSION:  Epilepsy.  The exact epilepsy syndrome is unclear at this time.  Her seizures were not well controlled over the past few years.  She continues to have 1 generalized tonic-clonic seizure per month on average.  She has been taking Keppra 500 mg t.i.d. for many years.  Her Keppra was increased to 750 mg t.i.d.  She felt much better and seizures are much better controlled for the past few weeks.    PLAN:    1.  Continue Keppra 750 mg t.i.d.  2.  Check Keppra level.  3.  Outpatient EEG for 3 hours.  4.  Return to clinic in 6 weeks.  Her anti-seizure medications might need to be adjusted if she continues to have frequent seizures.  We can increase the dose of Keppra or add a second regimen.  Options are lamotrigine, Trileptal, lacosamide, etc.      66 min total time was spent on the day of this visit.      42 min was spent on face to face time  8 min was spent on preparation of visit to review charts and labs, ordering medications and tests  16 min was spent on documentation of clinical information        Radha Mancini MD        D: 10/06/2021   T: 10/06/2021   MT: JODY    Name:     PATO FOLEY  MRN:      [id number]        Account:      [id number]   :      1960           Service Date: 10/06/2021       Document: [id number]

## 2021-10-06 NOTE — LETTER
10/6/2021       RE: Kim Mayen  [address]  [place] [address]     Dear Colleague,    Thank you for referring your patient, Kim Mayen, to the [facility]IC [place] at [facility]. Please see a copy of my visit note below.    Service Date: 10/06/2021    CHIEF COMPLAINT:  Seizures.    HISTORY OF PRESENT ILLNESS:  This patient is a 61-year-old right-handed female with a history of intractable seizures.  She was previously seen by Dr. Sergio Valencia in 2018.  She was referred to the Epilepsy Clinic at that time.  However, the patient never followed through.  The patient is accompanied by her /PCA in the clinic today.  The patient and her  are both poor historians.    The patient started to have seizures about 10 years ago.  The exact cause of the seizures is unclear.  According to the , she started to have seizures after she was wrongly evicted from her apartment about 10 years ago.  Her seizures were poorly controlled over the years and she has been taking Keppra 500 mg 3 times a day for many years and she continued to have about 1 seizure per month on average.  About 2 weeks ago, she was seen by Dr. Alba Ramesh, who is her primary care doctor, and her Keppra was increased to 750 mg 3 times a day.  According to the patient and her , it seems that she is doing much better on this increased dose of Keppra.  She is compliant with the medications.  No side effects were reported.    The patient seems to only have 1 type of seizure.  She has no auras.  She will have whole body jerking motions associated with tongue biting and urinary incontinence.  Seizures usually last for about 2 minutes and she will be unresponsive.  Postictally she will be confused and extremely fatigued.  Sometimes she will sleep for a long time postictally.  She is having on average 1 seizure per month for many years.    TRIGGERS FOR  SEIZURES:  Probably stress.    RISK FACTORS FOR SEIZURES:  She had no history of head trauma with loss of consciousness.  No history of CNS infection.  No history of febrile convulsions.  No history of brain tumor.  No history of stroke.  No family history of seizures.    CURRENT MEDICATIONS:  Keppra 750 mg t.i.d.      Current Outpatient Medications   Medication Sig Dispense Refill     ALBUTEROL IN Inhale into the lungs as needed       Cholecalciferol (VITAMIN D3 PO) Take by mouth daily       levETIRAcetam (KEPPRA) 750 MG tablet Take 1 tablet (750 mg) by mouth 3 times daily 90 tablet 11     LORATADINE PO Take by mouth daily       Multiple Vitamin (MULTIVITAMINS PO) Take by mouth daily       Nutritional Supplements (ENSURE) LIQD daily       rivaroxaban ANTICOAGULANT (XARELTO) 20 MG TABS tablet Take 20 mg by mouth       thiamine 100 MG tablet Take 100 mg by mouth daily         PAST ANTI-SEIZURE MEDICATIONS:  None.    PAST MEDICAL HISTORY:  Epilepsy, lower extremity DVT, history of abdominal mass, microcytic anemia.    MEDICATIONS:  None.    ALLERGIES:  No known drug allergies.    FAMILY HISTORY:  No family history of seizures.    SOCIAL HISTORY:  She lives with her /PCA.  No children.  No smoking.  Drinks alcohol occasionally.  No drug abuse.    PHYSICAL EXAMINATION:  Blood pressure 112/74, pulse 105, resp. rate 16, SpO2 96 %, not currently breastfeeding.    General exam: General Appearance:  No acute distress.  HEENT:  Normocephalic, atraumatic.  Neck:  Supple, no lymphadenopathy. Extremities:  No edema, no clubbing, no cyanosis.      Neurologic Exam:  Alert and oriented x3.  Speech fluent, appropriate. Normal attention.  Cranial Nerves:  Pupils are equal, round, reactive to light and accomodation.  Extraocular movement intact.  No facial weakness or asymmetry.  Facial sensation was normal.  Tongue and palate midline.  Hearing normal.  Visual field : normal to confrontation.   Motor Exam:  Normal bulk.  Normal  tone.  Strength 5/5 in all extremities.  Sensory:  Normal to light touch and vibration in all extremities.  Deep tendon reflexes 2+ bilaterally in both upper and lower extremities.  Coordination:  Finger-to-nose, heel-to-shin exams showed no ataxia.  Rapid alternating movement was normal.  Gait and Station:  Abnormal gait, wide based, stiff. Not able to tip-toe or heel walking.      REVIEW OF SYSTEMS:  A 12-point review of systems is essentially negative.    PREVIOUS DIAGNOSTIC TESTING:  MRI scan of the brain in October of 2016 showed mild to moderate age-related changes with small chronic lacunar infarcts in the bilateral cerebellar hemispheres.  EEG in 2008 at [facility] showed abnormal with generalized slowing of the background activities.  There were bifrontal as well as independent right and left interictal epileptiform discharges.    IMPRESSION:  Epilepsy.  The exact epilepsy syndrome is unclear at this time.  Her seizures were not well controlled over the past few years.  She continues to have 1 generalized tonic-clonic seizure per month on average.  She has been taking Keppra 500 mg t.i.d. for many years.  Her Keppra was increased to 750 mg t.i.d.  She felt much better and seizures are much better controlled for the past few weeks.    PLAN:    1.  Continue Keppra 750 mg t.i.d.  2.  Check Keppra level.  3.  Outpatient EEG for 3 hours.  4.  Return to clinic in 6 weeks.  Her anti-seizure medications might need to be adjusted if she continues to have frequent seizures.  We can increase the dose of Keppra or add a second regimen.  Options are lamotrigine, Trileptal, lacosamide, etc.      66 min total time was spent on the day of this visit.      42 min was spent on face to face time  8 min was spent on preparation of visit to review charts and labs, ordering medications and tests  16 min was spent on documentation of clinical information        Radha Mancini MD

## 2021-10-06 NOTE — NURSING NOTE
Chief Complaint   Patient presents with     Seizures     [facility] NEW SEIZURE       Stepan Gonzalez, EMT

## 2021-10-06 NOTE — PATIENT INSTRUCTIONS
Times of Days am pm hs       Medication Tablet Size Number of Tablets/Capsules Total Daily Dosage    Keppra 750 1 1 1    2250 mg                                                                                                                                   Carry this with you at all times.  CONTINUE TAKING YOUR OTHER MEDICATIONS AS PREVIOUSLY DIRECTED.      * * *Do not store medications in the bathroom.  Keep medications away from children!* * *

## 2021-10-07 LAB — LEVETIRACETAM SERPL-MCNC: 33 UG/ML

## 2022-10-26 DIAGNOSIS — G40.909 RECURRENT SEIZURES (H): Primary | ICD-10-CM

## 2022-11-01 RX ORDER — LEVETIRACETAM 750 MG/1
750 TABLET ORAL 3 TIMES DAILY
Qty: 90 TABLET | Refills: 0 | Status: SHIPPED | OUTPATIENT
Start: 2022-11-01 | End: 2022-12-29

## 2022-11-01 NOTE — TELEPHONE ENCOUNTER
Last Clinic Visit: [facility] 10/6/21  NV: 11/30/22  KEPPRA 750 MG #90, 0 refills with reminder to keep appt 11/30/22    Detail Level: Zone Plan: Location: right side back \\n\\nDiscussed with patient that the lesion is a benign sac filled with keratinized debris.\\nDiscussed with patient if they find it painful or bothersome, can excise.\\nF/u as needed. Plan: Location: face \\n\\nAdvised pt to get PDT done in the Fall due to too numerous to count non hyperkeratotic erythematous lesions on area.\\n\\n** Pt has had LN2 and/or has had chemotherapy (5FU) performed in the past and is getting repeated treatments. ** Plan: Location: body\\n\\nDermatographism was drawn to detect histamine level. \\nPatient has high histamine level and advised to start taking Allegra BID, especially during allergy season.\\nF/u as needed

## 2022-12-27 DIAGNOSIS — G40.909 RECURRENT SEIZURES (H): ICD-10-CM

## 2022-12-29 NOTE — TELEPHONE ENCOUNTER
LEVETIRACETAM 750MG TABLETS      Last Written Prescription Date:  11-1-22  Last Fill Quantity: 90,   # refills: 0  Last Office Visit : 10-6-21 ( RTC 6 weeks)  Future Office visit:  2-15-23    Routing refill request to provider for review/approval because:  Next appt outside of RTC timeframe

## 2023-01-03 RX ORDER — LEVETIRACETAM 750 MG/1
750 TABLET ORAL 3 TIMES DAILY
Qty: 93 TABLET | Refills: 1 | Status: SHIPPED | OUTPATIENT
Start: 2023-01-03 | End: 2023-06-19

## 2023-06-19 ENCOUNTER — TELEPHONE (OUTPATIENT)
Dept: NEUROLOGY | Facility: CLINIC | Age: 63
End: 2023-06-19
Payer: COMMERCIAL

## 2023-06-19 DIAGNOSIS — G40.909 RECURRENT SEIZURES (H): ICD-10-CM

## 2023-06-19 RX ORDER — LEVETIRACETAM 750 MG/1
750 TABLET ORAL 3 TIMES DAILY
Qty: 93 TABLET | Refills: 0 | Status: SHIPPED | OUTPATIENT
Start: 2023-06-19 | End: 2023-09-21

## 2023-06-19 NOTE — TELEPHONE ENCOUNTER
Health Call Center    Phone Message    May a detailed message be left on voicemail: yes     Reason for Call: Medication Refill Request    Has the patient contacted the pharmacy for the refill? Yes   Name of medication being requested: levETIRAcetam (KEPPRA) 750 MG tablet    Provider who prescribed the medication: Dr. Mancini  Pharmacy: [facility] #[id number] - [place], [place] - [address]  Date medication is needed:            Please follow up with patient.    Phone number to reach patient:  Cell number on file:    Telephone Information:   Mobile [phone]       Action Taken: Message routed to: [facility]    Travel Screening: Not Applicable    Bay Bacon on 6/19/2023 at 1:40 PM   - Neurology

## 2023-06-19 NOTE — TELEPHONE ENCOUNTER
Has not been seen since 10/2021 but has scheduled follow-up in 2 days on 6/21/23    [facility] for patient that levetiracetam refilled and to keep upcoming appointment for future refills.       Yadi Claros PA-C

## 2023-09-20 DIAGNOSIS — G40.909 RECURRENT SEIZURES (H): ICD-10-CM

## 2023-09-20 NOTE — LETTER
9/21/2023       RE: Kim Mayen  [address]  [place] [address]      Dear Kim,      We want to make sure you are aware of the policies in place regarding refills of medications.    We have provided adequate refills of your Keppra 750 mg tablets until you have your appointment with  on 9/29/2023.  To continue to provide refills, Dr. Mancini (or another [facility] provider) needs to see you in person at least once per year.  We encourage you to make every effort to keep the scheduled appointment. We will be unable to provide further refills if you do not attend.  We are also aware that occasionally things happen that are beyond our control, so please call us if there are any issues that make it impossible for you to attend, and we will discuss possible solutions with .     Thank you,      [facility] staff

## 2023-09-20 NOTE — TELEPHONE ENCOUNTER
[facility] Call Center    Phone Message    May a detailed message be left on voicemail: yes     Reason for Call: Medication Question or concern regarding medication   Prescription Clarification  Name of Medication: levETIRAcetam (KEPPRA) 750 MG tablet   Prescribing Provider:    Pharmacy:   [id number] - [place], [place] - [address] AT [address]      What on the order needs clarification? Rohan patient fiance called states that patient is out of medication 1 week ago.       Action Taken: Message routed to:  [facility] neurolog[facility]    Travel Screening: Not Applicable

## 2023-09-21 NOTE — TELEPHONE ENCOUNTER
Chart reviewed.  Patient has an appointment with  9/29/2023  Last seen 2021 and multiple no-shows since          Will mail letter to patient, was previously mailed a missed three appointments letter.  Will route to  with an 8 day fill pended for his review

## 2023-09-22 RX ORDER — LEVETIRACETAM 750 MG/1
750 TABLET ORAL 3 TIMES DAILY
Qty: 90 TABLET | Refills: 0 | Status: SHIPPED | OUTPATIENT
Start: 2023-09-22

## 2023-12-28 ENCOUNTER — TELEPHONE (OUTPATIENT)
Dept: NEUROLOGY | Facility: CLINIC | Age: 63
End: 2023-12-28
Payer: COMMERCIAL

## 2023-12-28 NOTE — TELEPHONE ENCOUNTER
Health Call Center    Phone Message    May a detailed message be left on voicemail: yes     Reason for Call: Ismael from [facility] asking to have the medications the Pt is taking for Seizures clarified and the dosage amounts, due to the spouse saying he has not followed the directions on the medications due to the Pt previously overdosing.    Please call Ismael @ [phone] to discuss further.    Action Taken: Message routed to:  [facility] ([facility] Neurology    Travel Screening: Not Applicable

## 2024-01-02 NOTE — TELEPHONE ENCOUNTER
"Chart reviewed.  Patient last seen by  10/6/2021 and has consistently \"no-show\" for scheduled appointments        Letters sent to the patient about attending appointments    Call placed to Ismael, Care Coordinator at [facility].  Voice message left on identified confidential voice mail, including dates of last visit, missed appointments, and date of last medication refill.  Call back number left.  "

## 2025-03-12 ENCOUNTER — TRANSCRIBE ORDERS (OUTPATIENT)
Dept: OTHER | Age: 65
End: 2025-03-12

## 2025-03-12 DIAGNOSIS — G40.909 EPILEPTIC SEIZURE (H): Primary | ICD-10-CM
